# Patient Record
Sex: FEMALE | Race: ASIAN | NOT HISPANIC OR LATINO
[De-identification: names, ages, dates, MRNs, and addresses within clinical notes are randomized per-mention and may not be internally consistent; named-entity substitution may affect disease eponyms.]

---

## 2024-04-05 PROBLEM — Z00.00 ENCOUNTER FOR PREVENTIVE HEALTH EXAMINATION: Status: ACTIVE | Noted: 2024-04-05

## 2024-04-08 ENCOUNTER — APPOINTMENT (OUTPATIENT)
Dept: ORTHOPEDIC SURGERY | Facility: CLINIC | Age: 49
End: 2024-04-08
Payer: COMMERCIAL

## 2024-04-08 DIAGNOSIS — G56.02 CARPAL TUNNEL SYNDROME, LEFT UPPER LIMB: ICD-10-CM

## 2024-04-08 PROCEDURE — 73130 X-RAY EXAM OF HAND: CPT | Mod: LT

## 2024-04-08 PROCEDURE — 99213 OFFICE O/P EST LOW 20 MIN: CPT

## 2024-04-08 RX ORDER — MELOXICAM 15 MG/1
15 TABLET ORAL DAILY
Qty: 30 | Refills: 1 | Status: ACTIVE | COMMUNITY
Start: 2024-04-08 | End: 1900-01-01

## 2024-04-08 NOTE — HISTORY OF PRESENT ILLNESS
[Gradual] : gradual [8] : 8 [6] : 6 [Radiating] : radiating [Sharp] : sharp [Intermittent] : intermittent [Leisure] : leisure [Sleep] : sleep [Rest] : rest [Exercising] : exercising [] : Post Surgical Visit: no [FreeTextEntry1] : L Wrist  [FreeTextEntry7] : L Donte

## 2024-04-08 NOTE — ASSESSMENT
[FreeTextEntry1] : Impression is #1 Elizabeth veins tendinitis I recommendation a combination of ice and anti-inflammatory medicine to alleviate her symptoms I am somewhat concerned about the numbness in her left hand and for that I recommended a nerve study be done of the left hand due to 3 to 4-month duration of her symptoms to rule out carpal tunnel she will follow-up after the nerve test is performed to discuss the results.

## 2024-04-08 NOTE — REASON FOR VISIT
[FreeTextEntry2] : Patient complains of L Wrist pain and stiffness x several months. Pain when bending./ Patient reports pain in the left wrist for several months but also some symptoms at night often waking her and some numbness in her left hand emanation today reveals localized tenderness over the radial side of her wrist with increased pain on ulnar deviation of the wrist and thumb consistent with Elizabeth veins tendinitis Phalen sign is somewhat positive in the left wrist while Tinel's is negative and she has minimal thenar atrophy she does report some occasional decreased sensation and tingling in the first 3 fingers of her left hand

## 2024-04-23 ENCOUNTER — APPOINTMENT (OUTPATIENT)
Dept: ORTHOPEDIC SURGERY | Facility: CLINIC | Age: 49
End: 2024-04-23
Payer: COMMERCIAL

## 2024-04-23 VITALS — BODY MASS INDEX: 18.48 KG/M2 | WEIGHT: 115 LBS | HEIGHT: 66 IN

## 2024-04-23 DIAGNOSIS — Z78.9 OTHER SPECIFIED HEALTH STATUS: ICD-10-CM

## 2024-04-23 PROCEDURE — 99213 OFFICE O/P EST LOW 20 MIN: CPT

## 2024-04-23 NOTE — HISTORY OF PRESENT ILLNESS
[4] : 4 [0] : 0 [Tightness] : tightness [Squeezing] : squeezing [Intermittent] : intermittent [Part time] : Work status: part time [] : no [FreeTextEntry1] : left wrist [de-identified] : certain postion [de-identified] : none

## 2024-04-23 NOTE — REASON FOR VISIT
[FreeTextEntry2] : FU Continued pain left wrist Patient reports that her left wrist is improved with use of the anti-inflammatory medication.  She currently denies any numbness and has not or is interested in getting the nerve test at this point in time creased pain on ulnar deviation of the wrist consistent with Elizabeth veins tendinitis both Phalen's and Tinel's are negative and at risk for carpal tunnel there is no significant thenar atrophy.  Sensation grossly normal in the fingers and hand of the left side full range of motion noted in all fingers.

## 2024-04-23 NOTE — ASSESSMENT
[FreeTextEntry1] : Patient has shown improvement with use of the anti-inflammatory medication she will continue using that and return 1 more time in 2 weeks.  If she is still symptomatic we may consider local cortisone injection.

## 2024-05-06 ENCOUNTER — APPOINTMENT (OUTPATIENT)
Dept: ORTHOPEDIC SURGERY | Facility: CLINIC | Age: 49
End: 2024-05-06
Payer: COMMERCIAL

## 2024-05-06 VITALS — BODY MASS INDEX: 18.48 KG/M2 | WEIGHT: 115 LBS | HEIGHT: 66 IN

## 2024-05-06 DIAGNOSIS — M65.4 RADIAL STYLOID TENOSYNOVITIS [DE QUERVAIN]: ICD-10-CM

## 2024-05-06 PROCEDURE — 99213 OFFICE O/P EST LOW 20 MIN: CPT

## 2024-05-06 NOTE — HISTORY OF PRESENT ILLNESS
[4] : 4 [0] : 0 [Tightness] : tightness [Squeezing] : squeezing [Intermittent] : intermittent [] : yes [Part time] : Work status: part time